# Patient Record
Sex: FEMALE | Race: OTHER
[De-identification: names, ages, dates, MRNs, and addresses within clinical notes are randomized per-mention and may not be internally consistent; named-entity substitution may affect disease eponyms.]

---

## 2021-10-08 ENCOUNTER — HOSPITAL ENCOUNTER (EMERGENCY)
Dept: HOSPITAL 61 - ER | Age: 21
Discharge: HOME | End: 2021-10-08
Payer: SELF-PAY

## 2021-10-08 VITALS — WEIGHT: 138.89 LBS | BODY MASS INDEX: 22.32 KG/M2 | HEIGHT: 66 IN

## 2021-10-08 VITALS — DIASTOLIC BLOOD PRESSURE: 82 MMHG | SYSTOLIC BLOOD PRESSURE: 118 MMHG

## 2021-10-08 DIAGNOSIS — R19.7: ICD-10-CM

## 2021-10-08 DIAGNOSIS — U07.1: Primary | ICD-10-CM

## 2021-10-08 DIAGNOSIS — R51.9: ICD-10-CM

## 2021-10-08 LAB
ALBUMIN SERPL-MCNC: 3 G/DL (ref 3.4–5)
ALBUMIN/GLOB SERPL: 0.7 {RATIO} (ref 1–1.7)
ALP SERPL-CCNC: 63 U/L (ref 46–116)
ALT SERPL-CCNC: 63 U/L (ref 14–59)
ANION GAP SERPL CALC-SCNC: 12 MMOL/L (ref 6–14)
AST SERPL-CCNC: 73 U/L (ref 15–37)
BASOPHILS # BLD AUTO: 0 X10^3/UL (ref 0–0.2)
BASOPHILS NFR BLD: 0 % (ref 0–3)
BILIRUB SERPL-MCNC: 0.6 MG/DL (ref 0.2–1)
BUN SERPL-MCNC: 8 MG/DL (ref 7–20)
BUN/CREAT SERPL: 11 (ref 6–20)
CALCIUM SERPL-MCNC: 8.4 MG/DL (ref 8.5–10.1)
CHLORIDE SERPL-SCNC: 102 MMOL/L (ref 98–107)
CO2 SERPL-SCNC: 25 MMOL/L (ref 21–32)
CREAT SERPL-MCNC: 0.7 MG/DL (ref 0.6–1)
EOSINOPHIL NFR BLD: 0 % (ref 0–3)
EOSINOPHIL NFR BLD: 0 X10^3/UL (ref 0–0.7)
ERYTHROCYTE [DISTWIDTH] IN BLOOD BY AUTOMATED COUNT: 14.3 % (ref 11.5–14.5)
GFR SERPLBLD BASED ON 1.73 SQ M-ARVRAT: 106.7 ML/MIN
GLUCOSE SERPL-MCNC: 97 MG/DL (ref 70–99)
HCT VFR BLD CALC: 34.4 % (ref 36–47)
HGB BLD-MCNC: 11.7 G/DL (ref 12–15.5)
INFLUENZA A PATIENT: NEGATIVE
INFLUENZA B PATIENT: NEGATIVE
LYMPHOCYTES # BLD: 1.2 X10^3/UL (ref 1–4.8)
LYMPHOCYTES NFR BLD AUTO: 14 % (ref 24–48)
MCH RBC QN AUTO: 29 PG (ref 25–35)
MCHC RBC AUTO-ENTMCNC: 34 G/DL (ref 31–37)
MCV RBC AUTO: 84 FL (ref 79–100)
MONO #: 0.6 X10^3/UL (ref 0–1.1)
MONOCYTES NFR BLD: 7 % (ref 0–9)
NEUT #: 6.9 X10^3/UL (ref 1.8–7.7)
NEUTROPHILS NFR BLD AUTO: 79 % (ref 31–73)
PLATELET # BLD AUTO: 277 X10^3/UL (ref 140–400)
POTASSIUM SERPL-SCNC: 3.7 MMOL/L (ref 3.5–5.1)
PROT SERPL-MCNC: 7.6 G/DL (ref 6.4–8.2)
RBC # BLD AUTO: 4.08 X10^6/UL (ref 3.5–5.4)
SODIUM SERPL-SCNC: 139 MMOL/L (ref 136–145)
WBC # BLD AUTO: 8.7 X10^3/UL (ref 4–11)

## 2021-10-08 PROCEDURE — 36415 COLL VENOUS BLD VENIPUNCTURE: CPT

## 2021-10-08 PROCEDURE — 87804 INFLUENZA ASSAY W/OPTIC: CPT

## 2021-10-08 PROCEDURE — 71045 X-RAY EXAM CHEST 1 VIEW: CPT

## 2021-10-08 PROCEDURE — 85025 COMPLETE CBC W/AUTO DIFF WBC: CPT

## 2021-10-08 PROCEDURE — 99285 EMERGENCY DEPT VISIT HI MDM: CPT

## 2021-10-08 PROCEDURE — 80053 COMPREHEN METABOLIC PANEL: CPT

## 2021-10-08 PROCEDURE — 81025 URINE PREGNANCY TEST: CPT

## 2021-10-08 PROCEDURE — 96374 THER/PROPH/DIAG INJ IV PUSH: CPT

## 2021-10-08 PROCEDURE — 96361 HYDRATE IV INFUSION ADD-ON: CPT

## 2021-10-08 PROCEDURE — 87426 SARSCOV CORONAVIRUS AG IA: CPT

## 2021-10-08 NOTE — PHYS DOC
Past Medical History


Past Surgical History:  No Surgical History


 (RAJI BERGER)





General Adult


EDM:


Chief Complaint:  COUGH





HPI:


HPI:





Patient is a 20-year-old female that presents today with cough cold body aches 

for the last 5 days.  Patient states entire household is sick she believes she 

has COVID-19.  Patient also complains of fever and chills.  Patient is  

speaking.  Patient also had nausea vomiting and diarrhea with this as well.  

Patient has not had her Covid vaccine, unknown exposure at this time]


 (RAJI BERGER)





Review of Systems:


Review of Systems:


Constitutional:   fever or chills. []


Eyes:   Denies change in visual acuity. []


HENT:   Denies nasal congestion or sore throat. [] 


Respiratory:   cough or shortness of breath. [] 


Cardiovascular:   Denies chest pain or edema. [] 


GI:   Denies abdominal pain, nausea, vomiting, bloody stools or diarrhea. [] 


:  Denies dysuria. [] 


Musculoskeletal:   body aches [] 


Integument:   Denies rash. [] 


Neurologic:   headache, denies focal weakness or sensory changes. [] 


Endocrine:   Denies polyuria or polydipsia. [] 


Lymphatic:  Denies swollen glands. [] 


Psychiatric:  Denies depression or anxiety. []


 (RAJI BERGER APRDAGOBERTO)





Heart Score:


C/O Chest Pain:  N/A


Risk Factors:


Risk Factors:  DM, Current or recent (<one month) smoker, HTN, HLP, family 

history of CAD, obesity.


Risk Scores:


Score 0 - 3:  2.5% MACE over next 6 weeks - Discharge Home


Score 4 - 6:  20.3% MACE over next 6 weeks - Admit for Clinical Observation


Score 7 - 10:  72.7% MACE over next 6 weeks - Early Invasive Strategies


 (RAJI BERGER)





Current Medications:





Current Medications








 Medications


  (Trade)  Dose


 Ordered  Sig/Thomas  Start Time


 Stop Time Status Last Admin


Dose Admin


 


 Ketorolac


 Tromethamine


  (Toradol 15mg


 Vial)  15 mg  1X  ONCE  10/8/21 17:00


 10/8/21 17:01 UNV  





 


 Sodium Chloride  1,000 ml @ 


 999 mls/hr  1X  ONCE  10/8/21 17:00


 10/8/21 18:00 UNV  











 (RAJI BERGER)





Allergies:


Allergies:





Allergies








Coded Allergies Type Severity Reaction Last Updated Verified


 


  No Known Drug Allergies    10/8/21 No








 (RAJI BERGER)





Physical Exam:


PE:





Constitutional: Well-developed female, in mild distress, coughing, appears ill 

[]


HENT:  bilateral external ears normal, TMs normal oropharynx dry, nose normal. 

[]


Eyes: PERRLA, EOMI, conjunctiva normal, no discharge. [] 


Neck: Normal range of motion, no tenderness, supple, no stridor. [] 


Cardiovascular:Heart rate regular rhythm, no murmur []


Lungs & Thorax: Breath sounds clear to auscultation, productive cough noted, 

chest wall tender to touch


Abdomen: Bowel sounds hypoactive, soft, no tenderness, no masses. [] 


Skin: Warm, dry, pale,  no erythema, no rash. []  


Neurologic: Alert and oriented X 3, normal motor function, normal sensory 

function, no focal deficits noted. []


Psychologic: Affect normal, judgement normal, mood normal. []


 (RAIJ BERGER)





Current Patient Data:


Vital Signs:





                                   Vital Signs








  Date Time  Temp Pulse Resp B/P (MAP) Pulse Ox O2 Delivery O2 Flow Rate FiO2


 


10/8/21 16:31 100.8 91 16 120/71 (87) 97 Room Air  





 100.8       








 (RAJI BERGER)





EKG:


EKG:


[]


 (RAJI BERGER)





Radiology/Procedures:


Radiology/Procedures:


PROCEDURE: CHEST AP ONLY





EXAM: Chest, single view.





HISTORY: Cough and shortness of air.





COMPARISON: None.





FINDINGS: A frontal view of the chest is obtained. There is diffuse interstitial

 infiltrate with partial right upper and lower lobe consolidation. There is no 

pleural effusion or pneumothorax. There is a prominent cardiac silhouette, 

likely accentuated due to portable technique.





IMPRESSION: Diffuse interstitial infiltrate with partial right upper and lower 

lobe consolidation. Follow-up to confirm resolution.





Electronically signed by: Hawa Mak MD (10/8/2021 5:16 PM) [id number]


 (RAJI BERGER)





Course & Med Decision Making:


Course & Med Decision Making


Pertinent Labs and Imaging studies reviewed. (See chart for det





1755 lab called with positive rapid Covid test that is positive. 





1900 using interpretive services went over Covid precautions and return 

instructions with patient.  Patient instructed to continue to isolate self for 

the next 9 days, return to the emergency department with increased shortness of 

breath, chest pain or any other concern.  Patient will be given incentive 

spirometer to use 2-3 times per day.  Patient instructed to use Tylenol and/or 

ibuprofen as labeled directed for fever and pain.  Patient will be giving some 

information on over-the-counter cough syrups to use for cough.  Patient 

instructed to get a coolmist humidifier in the household to help also with 

coughing.  Patient verbalized understanding of all discharge instruction.  []


 (RAJI BERGER)





Dragon Disclaimer:


Dragon Disclaimer:


This electronic medical record was generated, in whole or in part, using a voice

 recognition dictation system.


 (RAJI BERGER)





Departure


Departure


Impression:  


   Primary Impression:  


   COVID-19


Disposition:  01 HOME / SELF CARE / HOMELESS


Condition:  STABLE


Referrals:  


NO PCP (PCP)


Patient Instructions:  Upper Respiratory Infection, Adult





Additional Instructions:  


You are Covid positive, self isolate in your house for 9 more days. 


Use incentive spirometer 4-5 times daily.


Use over-the-counter cough medicine to help control cough.


Tylenol and ibuprofen as labeled directed for fever and pain


Increase fluid intake


Return to the emergency department with increased shortness of breath,  chest 

pain, or any other concerns. 


Follow-up with your primary care or clinic any concerns post Covid.











RAJI BERGER        Oct 8, 2021 17:00


RADHA COHEN DO             Oct 11, 2021 03:49

## 2021-10-08 NOTE — RAD
EXAM: Chest, single view.



HISTORY: Cough and shortness of air.



COMPARISON: None.



FINDINGS: A frontal view of the chest is obtained. There is diffuse interstitial infiltrate with part
ial right upper and lower lobe consolidation. There is no pleural effusion or pneumothorax. There is 
a prominent cardiac silhouette, likely accentuated due to portable technique.



IMPRESSION: Diffuse interstitial infiltrate with partial right upper and lower lobe consolidation. Fo
llow-up to confirm resolution.



Electronically signed by: Hawa Mak MD (10/8/2021 5:16 PM) [id number]

## 2022-03-08 ENCOUNTER — HOSPITAL ENCOUNTER (EMERGENCY)
Dept: HOSPITAL 61 - ER | Age: 22
Discharge: HOME | End: 2022-03-08
Payer: MEDICAID

## 2022-03-08 VITALS — SYSTOLIC BLOOD PRESSURE: 112 MMHG | DIASTOLIC BLOOD PRESSURE: 70 MMHG

## 2022-03-08 VITALS — HEIGHT: 63 IN | WEIGHT: 173.28 LBS | BODY MASS INDEX: 30.7 KG/M2

## 2022-03-08 DIAGNOSIS — R07.89: ICD-10-CM

## 2022-03-08 DIAGNOSIS — N39.0: Primary | ICD-10-CM

## 2022-03-08 LAB
ALBUMIN SERPL-MCNC: 4.1 G/DL (ref 3.4–5)
ALBUMIN/GLOB SERPL: 1.1 {RATIO} (ref 1–1.7)
ALP SERPL-CCNC: 69 U/L (ref 46–116)
ALT SERPL-CCNC: 24 U/L (ref 14–59)
ANION GAP SERPL CALC-SCNC: 10 MMOL/L (ref 6–14)
APTT PPP: YELLOW S
AST SERPL-CCNC: 18 U/L (ref 15–37)
BACTERIA #/AREA URNS HPF: (no result) /HPF
BASOPHILS # BLD AUTO: 0 X10^3/UL (ref 0–0.2)
BASOPHILS NFR BLD: 1 % (ref 0–3)
BILIRUB SERPL-MCNC: 0.4 MG/DL (ref 0.2–1)
BILIRUB UR QL STRIP: NEGATIVE
BUN SERPL-MCNC: 11 MG/DL (ref 7–20)
BUN/CREAT SERPL: 18 (ref 6–20)
CALCIUM SERPL-MCNC: 9.3 MG/DL (ref 8.5–10.1)
CHLORIDE SERPL-SCNC: 104 MMOL/L (ref 98–107)
CK SERPL-CCNC: 86 U/L (ref 26–192)
CO2 SERPL-SCNC: 26 MMOL/L (ref 21–32)
CREAT SERPL-MCNC: 0.6 MG/DL (ref 0.6–1)
EOSINOPHIL NFR BLD: 0 X10^3/UL (ref 0–0.7)
EOSINOPHIL NFR BLD: 1 % (ref 0–3)
ERYTHROCYTE [DISTWIDTH] IN BLOOD BY AUTOMATED COUNT: 14.4 % (ref 11.5–14.5)
FIBRINOGEN PPP-MCNC: (no result) MG/DL
GFR SERPLBLD BASED ON 1.73 SQ M-ARVRAT: 126.2 ML/MIN
GLUCOSE SERPL-MCNC: 97 MG/DL (ref 70–99)
HCT VFR BLD CALC: 37.7 % (ref 36–47)
HGB BLD-MCNC: 12.3 G/DL (ref 12–15.5)
LIPASE: 62 U/L (ref 73–393)
LYMPHOCYTES # BLD: 4.7 X10^3/UL (ref 1–4.8)
LYMPHOCYTES NFR BLD AUTO: 49 % (ref 24–48)
MAGNESIUM SERPL-MCNC: 2 MG/DL (ref 1.8–2.4)
MCH RBC QN AUTO: 28 PG (ref 25–35)
MCHC RBC AUTO-ENTMCNC: 33 G/DL (ref 31–37)
MCV RBC AUTO: 85 FL (ref 79–100)
MONO #: 1 X10^3/UL (ref 0–1.1)
MONOCYTES NFR BLD: 11 % (ref 0–9)
NEUT #: 3.7 X10^3/UL (ref 1.8–7.7)
NEUTROPHILS NFR BLD AUTO: 39 % (ref 31–73)
NITRITE UR QL STRIP: POSITIVE
PH UR STRIP: 6.5 [PH]
PLATELET # BLD AUTO: 321 X10^3/UL (ref 140–400)
POTASSIUM SERPL-SCNC: 3.7 MMOL/L (ref 3.5–5.1)
PROT SERPL-MCNC: 7.9 G/DL (ref 6.4–8.2)
PROT UR STRIP-MCNC: (no result) MG/DL
RBC # BLD AUTO: 4.46 X10^6/UL (ref 3.5–5.4)
RBC #/AREA URNS HPF: (no result) /HPF (ref 0–2)
SODIUM SERPL-SCNC: 140 MMOL/L (ref 136–145)
UROBILINOGEN UR-MCNC: 0.2 MG/DL
WBC # BLD AUTO: 9.5 X10^3/UL (ref 4–11)
WBC #/AREA URNS HPF: (no result) /HPF (ref 0–4)

## 2022-03-08 PROCEDURE — 99285 EMERGENCY DEPT VISIT HI MDM: CPT

## 2022-03-08 PROCEDURE — 80053 COMPREHEN METABOLIC PANEL: CPT

## 2022-03-08 PROCEDURE — 96374 THER/PROPH/DIAG INJ IV PUSH: CPT

## 2022-03-08 PROCEDURE — 82550 ASSAY OF CK (CPK): CPT

## 2022-03-08 PROCEDURE — 83690 ASSAY OF LIPASE: CPT

## 2022-03-08 PROCEDURE — 87086 URINE CULTURE/COLONY COUNT: CPT

## 2022-03-08 PROCEDURE — 81001 URINALYSIS AUTO W/SCOPE: CPT

## 2022-03-08 PROCEDURE — 83735 ASSAY OF MAGNESIUM: CPT

## 2022-03-08 PROCEDURE — 93005 ELECTROCARDIOGRAM TRACING: CPT

## 2022-03-08 PROCEDURE — 81025 URINE PREGNANCY TEST: CPT

## 2022-03-08 PROCEDURE — 85379 FIBRIN DEGRADATION QUANT: CPT

## 2022-03-08 PROCEDURE — 36415 COLL VENOUS BLD VENIPUNCTURE: CPT

## 2022-03-08 PROCEDURE — 85025 COMPLETE CBC W/AUTO DIFF WBC: CPT

## 2022-03-08 PROCEDURE — 71045 X-RAY EXAM CHEST 1 VIEW: CPT

## 2022-03-08 NOTE — PHYS DOC
Past Medical History


Past Surgical History:  No Surgical History


Smoking Status:  Never Smoker


Alcohol Use:  None





General Adult


EDM:


Chief Complaint:  FLANK PAIN





HPI:


HPI:





Patient is a 21  year old female who presents with 3-week history of left-sided 

chest and breast pain.  The pain is constant, sharp, worse with movement and 

palpation.  She denies any pleuritic pain or dyspnea.  She reports the pain 

radiates up into her axilla area.  She denies any true flank pain, denies 

abdominal pain, nausea, vomiting, diarrhea, constipation.  She reports some mild

urinary urgency, denies dysuria.  She denies pelvic pain.  She denies fevers or 

chills.  Denies anorexia.  Denies cough or hemoptysis.  Denies dizziness, 

dyspnea exertion, exertional chest pain.  She points to her left ribs and feels 

as if there might be a problem with her ribs.  She saw somewhat of the [facility] over a week ago, was prescribed naproxen without any reported 

relief.  No recent travel, no surgery, no hospitalization.  She denies lower 

extremity pain or swelling.





Review of Systems:


Review of Systems:


Constitutional:   Denies fever or chills. []


Eyes:   Denies change in visual acuity. []


HENT:   Denies nasal congestion or sore throat. [] 


Respiratory:   Denies cough or shortness of breath. [] 


Cardiovascular:   Left rib and left breast pain.  No peripheral edema.  No 

syncope


GI:   Denies abdominal pain, nausea, vomiting


:  Denies dysuria, gross hematuria.  She does report some urinary urgency.  

Denies pelvic pain or vaginal discharge.


Musculoskeletal:   Denies back pain or joint pain.  Denies flank pain


Integument:   Denies rash. [] 


Neurologic:   Denies headache, focal weakness or sensory changes. [] 


Psychiatric:  Denies depression or anxiety. []





Heart Score:


C/O Chest Pain:  Yes


HEART Score for Chest Pain:  








HEART Score for Chest Pain Response (Comments) Value


 


History Slighlty/Non-Suspicious 0


 


ECG Normal 0


 


Age < 45 0


 


Risk Factors No Risk Factors 0


 


Troponin < Normal Limit 0


 


Total  0








Risk Factors:


Risk Factors:  DM, Current or recent (<one month) smoker, HTN, HLP, family 

history of CAD, obesity.


Risk Scores:


Score 0 - 3:  2.5% MACE over next 6 weeks - Discharge Home


Score 4 - 6:  20.3% MACE over next 6 weeks - Admit for Clinical Observation


Score 7 - 10:  72.7% MACE over next 6 weeks - Early Invasive Strategies





Allergies:


Allergies:





Allergies








Coded Allergies Type Severity Reaction Last Updated Verified


 


  No Known Drug Allergies    10/8/21 No











Physical Exam:


PE:





Constitutional: Well developed, well nourished, no acute distress, non-toxic 

appearance. []


HENT: Normocephalic, atraumatic


Eyes: Conjunctive are normal, sclera anicteric.


Neck: Normal range of motion, no tenderness, supple, no stridor.  Achy midline. 

 No JVD.


Cardiovascular:Heart rate regular rhythm, was 2 radial and +2 posterior tibial 

pulses bilaterally, no edema, warm and well perfused


Lungs & Thorax: Lungs are clear to auscultation bilateral without rales, r

honchi, wheezes.  Equal chest rise.  No evidence of chest or thorax trauma.  

Palpation of her left lateral ribs and left breast reproduces her pain.  No 

palpable crepitus or step-offs.  No obvious visible breast abnormality, no rash.


Abdomen: Abdomen is soft, nondistended, nontender to palpation, no palpable 

masses organomegaly, no CVA tenderness.


Skin: Warm, dry, no erythema, no rash. [] 


Back: No tenderness, no CVA tenderness. [] 


Extremities: No tenderness, no cyanosis, no clubbing, ROM intact, no edema.  No 

calf tenderness


Neurologic: Alert and oriented X 3, normal motor function, normal sensory 

function, no focal deficits noted. []


Psychologic: Affect normal, judgement normal, mood normal.  Pleasant and 

cooperative





EKG:


EKG:


EKG is interpreted at 0917


Rhythm is sinus


Rate is 57 bpm


Axis is left


No STEMI





Radiology/Procedures:


Radiology/Procedures:


                                 IMAGING REPORT





                                     Signed





PATIENT: YADIRA MILLIGAN MACCOUNT: [id number]     MRN#: [id number]


: 2000           LOCATION: ER              AGE: 21


SEX: F                    EXAM DT: 22         ACCESSION#: [id number]


STATUS: REG ER            ORD. PHYSICIAN: RADHA COHEN DO


REASON: chest pain


PROCEDURE: PORTABLE CHEST 1V








AP portable chest  radiograph 3/8/2022





Clinical History: Chest pain.





An AP erect portable digital radiograph of the chest was obtained.





Comparison study is dated 10/8/2021.





The cardiac and mediastinal silhouettes are within normal limits in size and 

configuration. The diffuse perihilar infiltrates seen on the previous exa

mination have resolved. No acute pulmonary infiltrate is seen. No pleural 

effusion or pneumothorax is noted. The osseous structures are grossly intact.





Impression: No acute abnormality is seen.





Electronically signed by: Jevon Gonzalez MD (3/8/2022 9:12 AM) [id number]














DICTATED and SIGNED BY:     JEVON GONZALEZ MD


DATE:     22 [id number] 0





Course & Med Decision Making:


Course & Med Decision Making


Pertinent Labs and Imaging studies reviewed. (See chart for details)





Patient is given IV Toradol, and she reports this resolved her pain completely. 

 I discussed the findings, differential diagnosis and plan of care with her.  

She does have nitrate positive UA, with mild pyuria and bacteria.  She does have

 some subtle urinary symptoms.  Clinically, manifest no evidence of 

pyelonephritis.  She understands that urine culture is pending, she will be 

retreated for urinary tract infection.  She declined to take any pain 

medications for discharge.  I encouraged her to follow-up with her primary care 

physician for further evaluation and care.  Strict return precautions given.  S

he is comfortable to plan of care, verbalized understanding.





Dragon Disclaimer:


Dragon Disclaimer:


This electronic medical record was generated, in whole or in part, using a voice

 recognition dictation system.





Departure


Departure


Impression:  


   Primary Impression:  


   Chest wall pain


   Additional Impression:  


   Urinary tract infection


Disposition:   HOME / SELF CARE / HOMELESS


Condition:  STABLE


Referrals:  


NO PCP (PCP)


Patient Instructions:  Chest Wall Pain, Urinary Tract Infection





Additional Instructions:  


Take the full course of antibiotics as directed until gone.  Return to the ER 

for severe abdominal pain, vomiting, dehydration, temperature 100.4 or higher, 

coughing up blood, shortness of breath, more severe chest pain or any other 

concerns.  You may take over-the-counter Tylenol or ibuprofen as needed for 

pain.  Follow-up with your primary care physician.


Scripts


Cephalexin (KEFLEX) 500 Mg Capsule


1 CAP PO BID for 7 Days, #14 CAP


   Prov: RADHA COHEN DO         3/8/22 


Ibuprofen (IBUPROFEN) 600 Mg Tablet


600 MG PO PRN Q6HRS PRN for PAIN, #20 TAB


   take with food or milk


   Prov: RADHA COHEN DO         3/8/22











RADHA COHEN DO              Mar 8, 2022 08:31
26-Mar-2020 09:17

## 2022-03-08 NOTE — RAD
AP portable chest  radiograph 3/8/2022



Clinical History: Chest pain.



An AP erect portable digital radiograph of the chest was obtained.



Comparison study is dated 10/8/2021.



The cardiac and mediastinal silhouettes are within normal limits in size and configuration. The diffu
se perihilar infiltrates seen on the previous examination have resolved. No acute pulmonary infiltrat
e is seen. No pleural effusion or pneumothorax is noted. The osseous structures are grossly intact.



Impression: No acute abnormality is seen.



Electronically signed by: Jevon Mitchell MD (3/8/2022 9:12 AM) [id number]

## 2022-03-08 NOTE — EKG
[facility]

              [address] [place] [address]

Test Date:    2022               Test Time:    09:15:13

Pat Name:     YADIRA MILLIGAN    Department:   

Patient ID:   [id number]           Room:          

Gender:       F                        Technician:   

:          2000               Requested By: RADHA COHEN

Order Number: [id number]           Reading MD:   Cameron Oliva

                                 Measurements

Intervals                              Axis          

Rate:         57                       P:            47

AK:           160                      QRS:          -41

QRSD:         106                      T:            18

QT:           404                                    

QTc:          392                                    

                           Interpretive Statements

SINUS RHYTHM

ABNORMAL LEFT AXIS DEVIATION

LEFT ANTERIOR FASCICULAR BLOCK

INCOMPLETE RIGHT BUNDLE BRANCH BLOCK

ABNORMAL ECG

RI6.02

No previous ECG available for comparison

Electronically Signed On 3- 8:37:45 CST by Cameron Oliva